# Patient Record
Sex: FEMALE | Race: WHITE | NOT HISPANIC OR LATINO | Employment: UNEMPLOYED | URBAN - METROPOLITAN AREA
[De-identification: names, ages, dates, MRNs, and addresses within clinical notes are randomized per-mention and may not be internally consistent; named-entity substitution may affect disease eponyms.]

---

## 2017-02-15 ENCOUNTER — TRANSCRIBE ORDERS (OUTPATIENT)
Dept: ADMINISTRATIVE | Facility: HOSPITAL | Age: 57
End: 2017-02-15

## 2017-02-15 DIAGNOSIS — Z12.31 VISIT FOR SCREENING MAMMOGRAM: Primary | ICD-10-CM

## 2017-03-03 ENCOUNTER — HOSPITAL ENCOUNTER (OUTPATIENT)
Dept: MAMMOGRAPHY | Facility: HOSPITAL | Age: 57
Discharge: HOME | End: 2017-03-03
Attending: OBSTETRICS & GYNECOLOGY | Admitting: OBSTETRICS & GYNECOLOGY

## 2017-03-03 DIAGNOSIS — Z12.31 VISIT FOR SCREENING MAMMOGRAM: ICD-10-CM

## 2017-03-03 PROCEDURE — 77063 BREAST TOMOSYNTHESIS BI: CPT

## 2017-03-03 PROCEDURE — 77067 SCR MAMMO BI INCL CAD: CPT | Performed by: RADIOLOGY

## 2017-03-03 PROCEDURE — 77063 BREAST TOMOSYNTHESIS BI: CPT | Performed by: RADIOLOGY

## 2017-03-03 PROCEDURE — G0202 SCR MAMMO BI INCL CAD: HCPCS

## 2018-03-19 ENCOUNTER — TRANSCRIBE ORDERS (OUTPATIENT)
Dept: ADMINISTRATIVE | Facility: HOSPITAL | Age: 58
End: 2018-03-19

## 2018-03-19 DIAGNOSIS — Z12.31 VISIT FOR SCREENING MAMMOGRAM: Primary | ICD-10-CM

## 2018-03-20 ENCOUNTER — HOSPITAL ENCOUNTER (OUTPATIENT)
Dept: MAMMOGRAPHY | Facility: HOSPITAL | Age: 58
Discharge: HOME | End: 2018-03-20
Attending: OBSTETRICS & GYNECOLOGY | Admitting: OBSTETRICS & GYNECOLOGY

## 2018-03-20 DIAGNOSIS — Z12.31 VISIT FOR SCREENING MAMMOGRAM: ICD-10-CM

## 2018-03-20 PROCEDURE — 77063 BREAST TOMOSYNTHESIS BI: CPT | Performed by: RADIOLOGY

## 2018-03-20 PROCEDURE — 77067 SCR MAMMO BI INCL CAD: CPT

## 2018-03-20 PROCEDURE — 77067 SCR MAMMO BI INCL CAD: CPT | Performed by: RADIOLOGY

## 2018-03-20 PROCEDURE — 77063 BREAST TOMOSYNTHESIS BI: CPT

## 2018-11-29 ENCOUNTER — OFFICE VISIT (OUTPATIENT)
Dept: INTERNAL MEDICINE | Facility: CLINIC | Age: 58
End: 2018-11-29

## 2018-11-29 VITALS
SYSTOLIC BLOOD PRESSURE: 122 MMHG | HEIGHT: 70 IN | DIASTOLIC BLOOD PRESSURE: 64 MMHG | RESPIRATION RATE: 16 BRPM | WEIGHT: 162 LBS | BODY MASS INDEX: 23.19 KG/M2 | HEART RATE: 72 BPM

## 2018-11-29 DIAGNOSIS — E78.2 MIXED HYPERLIPIDEMIA: Primary | ICD-10-CM

## 2018-11-29 DIAGNOSIS — Z00.00 HEALTH MAINTENANCE EXAMINATION: ICD-10-CM

## 2018-11-29 PROCEDURE — 93000 ELECTROCARDIOGRAM COMPLETE: CPT | Performed by: INTERNAL MEDICINE

## 2018-11-29 PROCEDURE — 99386 PREV VISIT NEW AGE 40-64: CPT | Performed by: INTERNAL MEDICINE

## 2018-11-29 NOTE — PROGRESS NOTES
Procedure     ECG 12 Lead  Date/Time: 11/29/2018 3:57 PM  Performed by: Jemal Rodriguez MD  Authorized by: Jemal Rodriguez MD   Comparison: not compared with previous ECG   Rhythm: sinus rhythm  Rate: normal  QRS axis: normal  Clinical impression: normal ECG  Comments: Normal ECG but for short pr.

## 2018-11-29 NOTE — PROGRESS NOTES
Subjective   Candida Werner is a 57 y.o. female.     History of Present Illness   New patient for us for physical.  She feels well.  Has a history of hyperlipidemia but has never taken medication.  Has some varicose veins and had surgery for them 10 years ago.    The following portions of the patient's history were reviewed and updated as appropriate: allergies, current medications, past family history, past medical history, past social history, past surgical history and problem list.   See form.    Review of Systems   Constitutional: Negative.  Negative for appetite change, fatigue and fever.   HENT: Negative.  Negative for congestion, ear pain, hearing loss, rhinorrhea, sinus pressure, sore throat, tinnitus and trouble swallowing.    Eyes: Negative.  Negative for redness and visual disturbance.   Respiratory: Negative.  Negative for cough, chest tightness, shortness of breath and wheezing.    Cardiovascular: Negative.  Negative for chest pain, palpitations and leg swelling.   Gastrointestinal: Negative.  Negative for abdominal distention, abdominal pain, blood in stool, constipation, diarrhea, nausea and vomiting.   Endocrine: Negative.  Negative for polydipsia, polyphagia and polyuria.   Genitourinary: Negative.  Negative for difficulty urinating, dysuria, flank pain, frequency, menstrual problem, pelvic pain, vaginal bleeding and vaginal discharge.   Musculoskeletal: Negative.  Negative for arthralgias, back pain, gait problem, joint swelling and neck pain.        Some kneee pain at time.   Skin: Negative for rash.   Allergic/Immunologic: Negative.  Negative for environmental allergies.   Neurological: Negative.  Negative for dizziness, tremors, seizures, weakness, numbness and confusion.   Hematological: Negative.  Negative for adenopathy.   Psychiatric/Behavioral: Negative.  Negative for agitation, dysphoric mood, sleep disturbance and depressed mood. The patient is not nervous/anxious.        Objective    Physical Exam   Constitutional: She is oriented to person, place, and time. She appears well-developed and well-nourished.   HENT:   Head: Normocephalic and atraumatic.   Right Ear: External ear normal.   Left Ear: External ear normal.   Nose: Nose normal.   Mouth/Throat: Oropharynx is clear and moist.   Eyes: Conjunctivae and EOM are normal. Pupils are equal, round, and reactive to light.   Neck: Normal range of motion. Neck supple. No thyromegaly present.   Cardiovascular: Normal rate, regular rhythm and intact distal pulses. Exam reveals no gallop and no friction rub.   Murmur (1/6 small systolic murmur, she has had in past.) heard.  Carotids normal.   Pulmonary/Chest: Effort normal and breath sounds normal. No respiratory distress. She has no wheezes. She has no rales. She exhibits no tenderness. Right breast exhibits no mass, no skin change and no tenderness. Left breast exhibits no mass, no skin change and no tenderness.   Abdominal: Soft. Bowel sounds are normal. She exhibits no distension and no mass. There is no tenderness.   Musculoskeletal: Normal range of motion. She exhibits no edema.   Lymphadenopathy:     She has no cervical adenopathy.   Neurological: She is alert and oriented to person, place, and time. She has normal reflexes. No cranial nerve deficit.   Skin: Skin is warm and dry.   Mild varicosities on legs.   Psychiatric: She has a normal mood and affect. Her behavior is normal. Judgment and thought content normal.   Nursing note and vitals reviewed.        Assessment/Plan   Candida was seen today for establish care.    Diagnoses and all orders for this visit:    Mixed hyperlipidemia  -     Comprehensive Metabolic Panel  -     Lipid Panel  -     High Sensitivity CRP; Future    Health maintenance examination  -     CBC (No Diff)    She seems healthy but for lipids.  Labs as noted.  Recommended consider Cardiac calcium scan.  ECG basically is normal.  Counseled on diet and immunizations.  Health  maintenance is up to date.  Recheck yearly.

## 2018-12-19 ENCOUNTER — LAB (OUTPATIENT)
Dept: INTERNAL MEDICINE | Facility: CLINIC | Age: 58
End: 2018-12-19

## 2018-12-19 DIAGNOSIS — E78.2 MIXED HYPERLIPIDEMIA: ICD-10-CM

## 2018-12-19 LAB
ALBUMIN SERPL-MCNC: 4.27 G/DL (ref 3.2–4.8)
ALBUMIN/GLOB SERPL: 2.1 G/DL (ref 1.5–2.5)
ALP SERPL-CCNC: 57 U/L (ref 25–100)
ALT SERPL W P-5'-P-CCNC: 22 U/L (ref 7–40)
ANION GAP SERPL CALCULATED.3IONS-SCNC: 2 MMOL/L (ref 3–11)
ARTICHOKE IGE QN: 185 MG/DL (ref 0–130)
AST SERPL-CCNC: 22 U/L (ref 0–33)
BILIRUB SERPL-MCNC: 0.6 MG/DL (ref 0.3–1.2)
BUN BLD-MCNC: 14 MG/DL (ref 9–23)
BUN/CREAT SERPL: 16.9 (ref 7–25)
CALCIUM SPEC-SCNC: 9 MG/DL (ref 8.7–10.4)
CHLORIDE SERPL-SCNC: 103 MMOL/L (ref 99–109)
CHOLEST SERPL-MCNC: 253 MG/DL (ref 0–200)
CO2 SERPL-SCNC: 33 MMOL/L (ref 20–31)
CREAT BLD-MCNC: 0.83 MG/DL (ref 0.6–1.3)
CRP SERPL-MCNC: 0.02 MG/DL (ref 0–0.5)
GFR SERPL CREATININE-BSD FRML MDRD: 70 ML/MIN/1.73
GLOBULIN UR ELPH-MCNC: 2 GM/DL
GLUCOSE BLD-MCNC: 91 MG/DL (ref 70–100)
HDLC SERPL-MCNC: 66 MG/DL (ref 40–60)
POTASSIUM BLD-SCNC: 4.1 MMOL/L (ref 3.5–5.5)
PROT SERPL-MCNC: 6.3 G/DL (ref 5.7–8.2)
SODIUM BLD-SCNC: 138 MMOL/L (ref 132–146)
TRIGL SERPL-MCNC: 59 MG/DL (ref 0–150)

## 2018-12-19 PROCEDURE — 86141 C-REACTIVE PROTEIN HS: CPT | Performed by: INTERNAL MEDICINE

## 2018-12-19 PROCEDURE — 80053 COMPREHEN METABOLIC PANEL: CPT | Performed by: INTERNAL MEDICINE

## 2018-12-19 PROCEDURE — 36415 COLL VENOUS BLD VENIPUNCTURE: CPT | Performed by: INTERNAL MEDICINE

## 2018-12-19 PROCEDURE — 80061 LIPID PANEL: CPT | Performed by: INTERNAL MEDICINE

## 2018-12-20 ENCOUNTER — TELEPHONE (OUTPATIENT)
Dept: INTERNAL MEDICINE | Facility: CLINIC | Age: 58
End: 2018-12-20

## 2018-12-20 DIAGNOSIS — Z00.00 HEALTH MAINTENANCE EXAMINATION: Primary | ICD-10-CM

## 2018-12-20 NOTE — TELEPHONE ENCOUNTER
12-20-18  Patients CBC needs to be recollected due to plt. Clumping needs to be drawn in sodium citrate tube when redrawing her .Do you want patient called back in for this?

## 2018-12-21 ENCOUNTER — TELEPHONE (OUTPATIENT)
Dept: INTERNAL MEDICINE | Facility: CLINIC | Age: 58
End: 2018-12-21

## 2018-12-21 ENCOUNTER — LAB (OUTPATIENT)
Dept: INTERNAL MEDICINE | Facility: CLINIC | Age: 58
End: 2018-12-21

## 2018-12-21 DIAGNOSIS — E78.2 MIXED HYPERLIPIDEMIA: ICD-10-CM

## 2018-12-21 DIAGNOSIS — D69.6 THROMBOCYTOPENIA (HCC): Primary | ICD-10-CM

## 2018-12-21 DIAGNOSIS — Z00.00 HEALTH MAINTENANCE EXAMINATION: ICD-10-CM

## 2018-12-21 LAB
DEPRECATED RDW RBC AUTO: 40.6 FL (ref 37–54)
ERYTHROCYTE [DISTWIDTH] IN BLOOD BY AUTOMATED COUNT: 12.2 % (ref 11.3–14.5)
HCT VFR BLD AUTO: 39.4 % (ref 34.5–44)
HGB BLD-MCNC: 13 G/DL (ref 11.5–15.5)
MCH RBC QN AUTO: 29.9 PG (ref 27–31)
MCHC RBC AUTO-ENTMCNC: 33 G/DL (ref 32–36)
MCV RBC AUTO: 90.6 FL (ref 80–99)
PLATELET # BLD AUTO: 16 10*3/MM3 (ref 150–450)
PLATELETS (CITRATED) BY AUTOMATED COUNT: 39 10*3/MM3 (ref 150–450)
PMV BLD AUTO: 10.5 FL (ref 6–12)
RBC # BLD AUTO: 4.35 10*6/MM3 (ref 3.89–5.14)
WBC NRBC COR # BLD: 3.58 10*3/MM3 (ref 3.5–10.8)

## 2018-12-21 PROCEDURE — 85027 COMPLETE CBC AUTOMATED: CPT | Performed by: INTERNAL MEDICINE

## 2018-12-21 PROCEDURE — 36415 COLL VENOUS BLD VENIPUNCTURE: CPT | Performed by: INTERNAL MEDICINE

## 2018-12-21 PROCEDURE — 85049 AUTOMATED PLATELET COUNT: CPT | Performed by: INTERNAL MEDICINE

## 2018-12-24 ENCOUNTER — TELEPHONE (OUTPATIENT)
Dept: INTERNAL MEDICINE | Facility: CLINIC | Age: 58
End: 2018-12-24

## 2019-01-02 PROBLEM — D69.6 THROMBOCYTOPENIA (HCC): Status: ACTIVE | Noted: 2019-01-02

## 2019-01-03 ENCOUNTER — CONSULT (OUTPATIENT)
Dept: ONCOLOGY | Facility: CLINIC | Age: 59
End: 2019-01-03

## 2019-01-03 ENCOUNTER — LAB (OUTPATIENT)
Dept: LAB | Facility: HOSPITAL | Age: 59
End: 2019-01-03

## 2019-01-03 VITALS
HEIGHT: 70 IN | RESPIRATION RATE: 16 BRPM | TEMPERATURE: 98.8 F | BODY MASS INDEX: 23.34 KG/M2 | OXYGEN SATURATION: 99 % | SYSTOLIC BLOOD PRESSURE: 120 MMHG | HEART RATE: 64 BPM | DIASTOLIC BLOOD PRESSURE: 80 MMHG | WEIGHT: 163 LBS

## 2019-01-03 DIAGNOSIS — D69.6 THROMBOCYTOPENIA (HCC): Primary | Chronic | ICD-10-CM

## 2019-01-03 DIAGNOSIS — D69.6 THROMBOCYTOPENIA (HCC): ICD-10-CM

## 2019-01-03 LAB
ALBUMIN SERPL-MCNC: 4.31 G/DL (ref 3.2–4.8)
ALBUMIN/GLOB SERPL: 2.1 G/DL (ref 1.5–2.5)
ALP SERPL-CCNC: 67 U/L (ref 25–100)
ALT SERPL W P-5'-P-CCNC: 23 U/L (ref 7–40)
ANION GAP SERPL CALCULATED.3IONS-SCNC: 6 MMOL/L (ref 3–11)
APTT PPP: 26.1 SECONDS (ref 24–31)
AST SERPL-CCNC: 23 U/L (ref 0–33)
BASOPHILS # BLD AUTO: 0.02 10*3/MM3 (ref 0–0.2)
BASOPHILS NFR BLD AUTO: 0.4 % (ref 0–1)
BILIRUB SERPL-MCNC: 0.5 MG/DL (ref 0.3–1.2)
BUN BLD-MCNC: 19 MG/DL (ref 9–23)
BUN/CREAT SERPL: 23.2 (ref 7–25)
CALCIUM SPEC-SCNC: 9.3 MG/DL (ref 8.7–10.4)
CHLORIDE SERPL-SCNC: 102 MMOL/L (ref 99–109)
CO2 SERPL-SCNC: 29 MMOL/L (ref 20–31)
CREAT BLD-MCNC: 0.82 MG/DL (ref 0.6–1.3)
D DIMER PPP FEU-MCNC: <0.19 MG/L (FEU) (ref 0–0.5)
DEPRECATED RDW RBC AUTO: 41.3 FL (ref 37–54)
EOSINOPHIL # BLD AUTO: 0.1 10*3/MM3 (ref 0–0.3)
EOSINOPHIL NFR BLD AUTO: 1.9 % (ref 0–3)
ERYTHROCYTE [DISTWIDTH] IN BLOOD BY AUTOMATED COUNT: 12.4 % (ref 11.3–14.5)
ERYTHROCYTE [SEDIMENTATION RATE] IN BLOOD: 8 MM/HR (ref 0–30)
FIBRINOGEN PPP-MCNC: 224 MG/DL (ref 198–466)
FSP PPP LA-ACNC: NORMAL
GFR SERPL CREATININE-BSD FRML MDRD: 71 ML/MIN/1.73
GLOBULIN UR ELPH-MCNC: 2.1 GM/DL
GLUCOSE BLD-MCNC: 100 MG/DL (ref 70–100)
HCT VFR BLD AUTO: 40.9 % (ref 34.5–44)
HGB BLD-MCNC: 13.3 G/DL (ref 11.5–15.5)
IMM GRANULOCYTES # BLD AUTO: 0.01 10*3/MM3 (ref 0–0.03)
IMM GRANULOCYTES NFR BLD AUTO: 0.2 % (ref 0–0.6)
INR PPP: 1 (ref 0.91–1.09)
LYMPHOCYTES # BLD AUTO: 1.21 10*3/MM3 (ref 0.6–4.8)
LYMPHOCYTES NFR BLD AUTO: 23 % (ref 24–44)
MCH RBC QN AUTO: 29.5 PG (ref 27–31)
MCHC RBC AUTO-ENTMCNC: 32.5 G/DL (ref 32–36)
MCV RBC AUTO: 90.7 FL (ref 80–99)
MONOCYTES # BLD AUTO: 0.38 10*3/MM3 (ref 0–1)
MONOCYTES NFR BLD AUTO: 7.2 % (ref 0–12)
NEUTROPHILS # BLD AUTO: 3.55 10*3/MM3 (ref 1.5–8.3)
NEUTROPHILS NFR BLD AUTO: 67.5 % (ref 41–71)
PLATELET # BLD AUTO: 100 10*3/MM3 (ref 150–450)
PMV BLD AUTO: 11.5 FL (ref 6–12)
POTASSIUM BLD-SCNC: 4.3 MMOL/L (ref 3.5–5.5)
PROT SERPL-MCNC: 6.4 G/DL (ref 5.7–8.2)
PROTHROMBIN TIME: 10.5 SECONDS (ref 9.6–11.5)
RBC # BLD AUTO: 4.51 10*6/MM3 (ref 3.89–5.14)
SODIUM BLD-SCNC: 137 MMOL/L (ref 132–146)
WBC NRBC COR # BLD: 5.26 10*3/MM3 (ref 3.5–10.8)

## 2019-01-03 PROCEDURE — 85384 FIBRINOGEN ACTIVITY: CPT

## 2019-01-03 PROCEDURE — 85060 BLOOD SMEAR INTERPRETATION: CPT

## 2019-01-03 PROCEDURE — 85025 COMPLETE CBC W/AUTO DIFF WBC: CPT

## 2019-01-03 PROCEDURE — 85379 FIBRIN DEGRADATION QUANT: CPT

## 2019-01-03 PROCEDURE — 85610 PROTHROMBIN TIME: CPT

## 2019-01-03 PROCEDURE — 86677 HELICOBACTER PYLORI ANTIBODY: CPT

## 2019-01-03 PROCEDURE — 99245 OFF/OP CONSLTJ NEW/EST HI 55: CPT | Performed by: INTERNAL MEDICINE

## 2019-01-03 PROCEDURE — 85652 RBC SED RATE AUTOMATED: CPT

## 2019-01-03 PROCEDURE — 36415 COLL VENOUS BLD VENIPUNCTURE: CPT

## 2019-01-03 PROCEDURE — 80053 COMPREHEN METABOLIC PANEL: CPT

## 2019-01-03 PROCEDURE — 85362 FIBRIN DEGRADATION PRODUCTS: CPT

## 2019-01-03 PROCEDURE — 86038 ANTINUCLEAR ANTIBODIES: CPT

## 2019-01-03 PROCEDURE — 86430 RHEUMATOID FACTOR TEST QUAL: CPT

## 2019-01-03 PROCEDURE — 85730 THROMBOPLASTIN TIME PARTIAL: CPT

## 2019-01-03 RX ORDER — CONJUGATED ESTROGENS 0.62 MG/G
CREAM VAGINAL
COMMUNITY
Start: 2018-12-10 | End: 2020-11-12

## 2019-01-04 LAB
ANA SER QL IA: NEGATIVE
CYTOLOGIST CVX/VAG CYTO: NORMAL
H PYLORI IGA SER IA-ACNC: <9 UNITS (ref 0–8.9)
H PYLORI IGG SER IA-ACNC: 0.23 INDEX VALUE (ref 0–0.79)
H PYLORI IGM SER-ACNC: <9 UNITS (ref 0–8.9)
PATH INTERP BLD-IMP: NORMAL
RHEUMATOID FACT SERPL-ACNC: NEGATIVE [IU]/ML

## 2019-01-11 ENCOUNTER — OFFICE VISIT (OUTPATIENT)
Dept: ONCOLOGY | Facility: CLINIC | Age: 59
End: 2019-01-11

## 2019-01-11 ENCOUNTER — LAB (OUTPATIENT)
Dept: LAB | Facility: HOSPITAL | Age: 59
End: 2019-01-11

## 2019-01-11 VITALS
WEIGHT: 164 LBS | HEIGHT: 70 IN | BODY MASS INDEX: 23.48 KG/M2 | RESPIRATION RATE: 14 BRPM | TEMPERATURE: 98.7 F | OXYGEN SATURATION: 98 % | HEART RATE: 70 BPM | SYSTOLIC BLOOD PRESSURE: 139 MMHG | DIASTOLIC BLOOD PRESSURE: 72 MMHG

## 2019-01-11 DIAGNOSIS — D69.6 THROMBOCYTOPENIA, UNSPECIFIED (HCC): Primary | ICD-10-CM

## 2019-01-11 DIAGNOSIS — D69.6 THROMBOCYTOPENIA (HCC): ICD-10-CM

## 2019-01-11 DIAGNOSIS — D69.6 THROMBOCYTOPENIA (HCC): Primary | Chronic | ICD-10-CM

## 2019-01-11 LAB
ERYTHROCYTE [DISTWIDTH] IN BLOOD BY AUTOMATED COUNT: 12.6 % (ref 11.3–14.5)
HCT VFR BLD AUTO: 39.2 % (ref 34.5–44)
HGB BLD-MCNC: 13.1 G/DL (ref 11.5–15.5)
LYMPHOCYTES # BLD AUTO: 1.6 10*3/MM3 (ref 0.6–4.8)
LYMPHOCYTES NFR BLD AUTO: 31.9 % (ref 24–44)
MCH RBC QN AUTO: 30.3 PG (ref 27–31)
MCHC RBC AUTO-ENTMCNC: 33.5 G/DL (ref 32–36)
MCV RBC AUTO: 90.5 FL (ref 80–99)
MONOCYTES # BLD AUTO: 0.2 10*3/MM3 (ref 0–1)
MONOCYTES NFR BLD AUTO: 3.4 % (ref 0–12)
NEUTROPHILS # BLD AUTO: 3.2 10*3/MM3 (ref 1.5–8.3)
NEUTROPHILS NFR BLD AUTO: 64.7 % (ref 41–71)
PLATELET # BLD AUTO: 129 10*3/MM3 (ref 150–450)
PLATELETS (CITRATED) BY AUTOMATED COUNT: 187 10*3/MM3 (ref 150–450)
PMV BLD AUTO: 9 FL (ref 6–12)
RBC # BLD AUTO: 4.33 10*6/MM3 (ref 3.89–5.14)
WBC NRBC COR # BLD: 5 10*3/MM3 (ref 3.5–10.8)

## 2019-01-11 PROCEDURE — 85025 COMPLETE CBC W/AUTO DIFF WBC: CPT

## 2019-01-11 PROCEDURE — 36415 COLL VENOUS BLD VENIPUNCTURE: CPT

## 2019-01-11 PROCEDURE — 99213 OFFICE O/P EST LOW 20 MIN: CPT | Performed by: INTERNAL MEDICINE

## 2019-01-11 PROCEDURE — 85049 AUTOMATED PLATELET COUNT: CPT

## 2019-01-11 NOTE — PROGRESS NOTES
CHIEF COMPLAINT: Probable pseudothrombocytopenia    Problem List:  Oncology/Hematology History    1. Thrombocytopenia: Had not seen a physician for years.  Decided to get established and saw Dr. Rodriguez.  At that junction had routine labs.On 12/21/18 CBC revealed white count of 3580 with hemoglobin 13 and platelets of 16,000 with platelets in a citrated tube of 39,000.  C-reactive protein was normal.  Total cholesterol was quite high at 253 with an LDL of 185.  CMP was unremarkable including creatinine of 0.83.  No petechiae or ecchymoses.  No easy bruising or bleeding with trauma.  No fevers chills or unexplained weight loss.  No rashes.  No consumption of undercooked beef.  No symptoms of reflux to suggest Helicobacter  pylori exposure.  Has not been taking H2 blockers which are known to cause this as well.  Given the lack of clinical symptoms, even with a platelet count of 16,000, I suggested that we hold on steroids and look at her peripheral smear for platelet clumping and/or schistocytes.  I would check her d-dimer and fibrin split products along with fibrinogen though I doubt DIC or TTP/HUS.  No hint of hemolysis or renal dysfunction.  No clinical symptoms to suggest other autoimmune diseases other than she has lichen planus which may be an additional autoimmune process.  I think the odds of this being an M3 promyelocytic leukemia is extremely unlikely but I will look at her peripheral smear along with JEANE and rheumatoid factor were hence of other autoimmune phenomena.  While this could be pseudothrombocytopenia given the slightly higher platelet count in a citrated tube, the degree of the thrombocytopenia would be a bit unusual for that but we shall see.  Given her complete lack of any stigmata for thrombocytopenia I will not start her on steroids yet even with the platelet count initially of 16,000 as I'm not convinced yet of the actual platelet count.  Lastly, I told her to hold her niacin for the time  being which can be associated with thrombocytopenia as well as a prolonged pro time.  However, she says she does not take a big dose of this and does not necessarily take it daily.  -1/10/19: Return for review of the above labs.  Peripheral smear showed platelet clumping with a platelet count 100,000 and the estimated platelet quantity to be normal.  Helicobacter pylori was negative.  Normal coagulation studies.  Fibrin split products less than 10.  D-dimer negative.  Rheumatoid factor and JEANE negative.  Sedimentation rate 8.  CMP normal.  I will repeat her CBC and make citrate and heparin tubes.  Strongly suspect pseudothrombocytopenia.  No schistocytes on the peripheral smear        Thrombocytopenia (CMS/HCC)    1/2/2019 Initial Diagnosis     Thrombocytopenia (CMS/HCC)            HISTORY OF PRESENT ILLNESS:  The patient is a 58 y.o. female, here for follow up on management of probable pseudothrombocytopenia.  See above for my synopsis of her laboratories to date.  She did have platelet clumping on her peripheral smear but the platelet number appeared adequate to the pathologist's eye and the machine count was 100,000      Past Medical History:   Diagnosis Date   • Fibrocystic breast    • Hyperlipidemia    • Ovarian tumor      Past Surgical History:   Procedure Laterality Date   • HAND SURGERY         No Known Allergies    Family History and Social History reviewed and changed as necessary      REVIEW OF SYSTEM:   Review of Systems   Constitutional: Negative for appetite change, chills, diaphoresis, fatigue, fever and unexpected weight change.   HENT:   Negative for mouth sores, sore throat and trouble swallowing.    Eyes: Negative for icterus.   Respiratory: Negative for cough, hemoptysis and shortness of breath.    Cardiovascular: Negative for chest pain, leg swelling and palpitations.   Gastrointestinal: Negative for abdominal distention, abdominal pain, blood in stool, constipation, diarrhea, nausea and  "vomiting.   Endocrine: Negative for hot flashes.   Genitourinary: Negative for bladder incontinence, difficulty urinating, dysuria, frequency and hematuria.    Musculoskeletal: Negative for gait problem, neck pain and neck stiffness.   Skin: Negative for rash.   Neurological: Negative for dizziness, gait problem, headaches, light-headedness and numbness.   Hematological: Negative for adenopathy. Does not bruise/bleed easily.   Psychiatric/Behavioral: Negative for depression. The patient is not nervous/anxious.    All other systems reviewed and are negative.       PHYSICAL EXAM    Vitals:    01/11/19 0953   BP: 139/72   Pulse: 70   Resp: 14   Temp: 98.7 °F (37.1 °C)   SpO2: 98%   Weight: 74.4 kg (164 lb)   Height: 177.8 cm (70\")     Constitutional: Appears well-developed and well-nourished. No distress.   ECOG: (0) Fully active, able to carry on all predisease performance without restriction  HENT:   Head: Normocephalic.   Mouth/Throat: Oropharynx is clear and moist.   Eyes: Conjunctivae are normal. Pupils are equal, round, and reactive to light. No scleral icterus.   Neck: Neck supple. No JVD present. No thyromegaly present.   Cardiovascular: Normal rate, regular rhythm and normal heart sounds.    Pulmonary/Chest: Breath sounds normal. No respiratory distress.   Abdominal: Soft. Exhibits no distension and no mass. There is no hepatosplenomegaly. There is no tenderness. There is no rebound and no guarding.   Musculoskeletal:Exhibits no edema, tenderness or deformity.   Neurological: Alert and oriented to person, place, and time. Exhibits normal muscle tone.   Skin: No ecchymosis, no petechiae and no rash noted. Not diaphoretic. No cyanosis. Nails show no clubbing.   Psychiatric: Normal mood and affect.   Vitals reviewed.      No radiology results for the last 7 days          ASSESSMENT & PLAN:    1.  Probable pseudothrombocytopenia: I will check her CBC today and in EDTA, citrate, and heparin tubes and we will call " her these results.  I do not think she needs further workup beyond that and there is corroborating evidence of platelet clumping on her peripheral smear with normal numbers of platelets visually under the microscope.  No evidence of DIC or other autoimmune processes to suggest immune destruction in the setting of other autoimmune disease.  This could still be ITP as there is no way to rule that out completely but I think we have a good explanation with the platelet clumping.  There are no schistocytes to suggest microangiopathic process either.  Her JEANE and rheumatoid factor and sedimentation rate were normal.  Coagulations normal.  Helicobacter pylori negative.  D-dimer and fibrin split products normal.  Discussed with patient 20 minutes greater than 50% spent counseling      Abhi Price MD    01/11/2019    Addendum: Her platelet count in EDTA today was 120,000's.  In citrate tube her platelet count was over 180,000.  She has pseudothrombocytopenia and when she needs a blood count she should get this done with citrate to check the platelet count.  I will see as needed

## 2019-01-14 ENCOUNTER — HOSPITAL ENCOUNTER (OUTPATIENT)
Dept: CT IMAGING | Facility: HOSPITAL | Age: 59
End: 2019-01-14
Attending: INTERNAL MEDICINE

## 2019-01-31 ENCOUNTER — HOSPITAL ENCOUNTER (OUTPATIENT)
Dept: CT IMAGING | Facility: HOSPITAL | Age: 59
Discharge: HOME | End: 2019-01-31
Attending: INTERNAL MEDICINE | Admitting: INTERNAL MEDICINE

## 2019-01-31 DIAGNOSIS — E78.2 MIXED HYPERLIPIDEMIA: ICD-10-CM

## 2019-01-31 PROCEDURE — 75571 CT HRT W/O DYE W/CA TEST: CPT

## 2019-02-05 ENCOUNTER — TELEPHONE (OUTPATIENT)
Dept: INTERNAL MEDICINE | Facility: CLINIC | Age: 59
End: 2019-02-05

## 2019-02-05 NOTE — TELEPHONE ENCOUNTER
----- Message from Jemal Rodriguez MD sent at 2/3/2019 11:00 AM EST -----  Please send out the letter.  Thanks.

## 2019-03-08 ENCOUNTER — TELEPHONE (OUTPATIENT)
Dept: INTERNAL MEDICINE | Facility: CLINIC | Age: 59
End: 2019-03-08

## 2019-03-08 RX ORDER — OSELTAMIVIR PHOSPHATE 75 MG/1
75 CAPSULE ORAL DAILY
Qty: 7 CAPSULE | Refills: 0 | Status: SHIPPED | OUTPATIENT
Start: 2019-03-08 | End: 2019-03-15

## 2019-03-08 NOTE — TELEPHONE ENCOUNTER
----- Message from Elizabeth Nagel sent at 3/8/2019  2:03 PM EST -----  Contact: SELF  BHASKAR CAR WOKE UP WITH A SCRATCHY THROAT THIS MORNING AND HER GRANDCHILDREN WHO SHE SAW ON Monday AND Tuesday WERE JUST DIAGNOSIS WITH THE FLU. SHE WANTS TO KNOW IF SHE CAN GET SOMETHING CALLED IN FOR THIS. SHE USES THE [facility] IN [place]. SHE CAN BE REACHED [phone]

## 2019-03-08 NOTE — TELEPHONE ENCOUNTER
Tamiflu ppx sent in. Can also take OTC Mucinex and/or Sudafed PRN. Would use Sudafed sparingly due to risk for HTN.    Needs to be seen if F/C, SOB, not getting better w/ above.

## 2019-03-27 ENCOUNTER — OFFICE VISIT (OUTPATIENT)
Dept: INTERNAL MEDICINE | Facility: CLINIC | Age: 59
End: 2019-03-27

## 2019-03-27 VITALS
TEMPERATURE: 99.3 F | DIASTOLIC BLOOD PRESSURE: 80 MMHG | RESPIRATION RATE: 18 BRPM | HEART RATE: 64 BPM | SYSTOLIC BLOOD PRESSURE: 120 MMHG | BODY MASS INDEX: 22.76 KG/M2 | OXYGEN SATURATION: 97 % | WEIGHT: 158.6 LBS

## 2019-03-27 DIAGNOSIS — J32.9 SINUSITIS, UNSPECIFIED CHRONICITY, UNSPECIFIED LOCATION: Primary | ICD-10-CM

## 2019-03-27 PROCEDURE — 99213 OFFICE O/P EST LOW 20 MIN: CPT | Performed by: NURSE PRACTITIONER

## 2019-03-27 RX ORDER — CEFDINIR 300 MG/1
300 CAPSULE ORAL 2 TIMES DAILY
Qty: 20 CAPSULE | Refills: 0 | OUTPATIENT
Start: 2019-03-27 | End: 2019-07-31

## 2019-03-27 NOTE — PROGRESS NOTES
Chief Complaint   Patient presents with   • SAME DAY     flu on march 9, fever, still congestion. sore throat, patient feels she has a cold         Subjective     History of Present Illness   Patient is here today for cough slight runny eyes.  Fever on 3/9/19 --recent babysitting grandchild with flu.  Took motrin 2 and slept well and took tamiflu.     She felt like she was getting over the cold. [place] made ears clog.  Started mucinex for one week. Also had conjunctivitis.     Dry cough ST.  Using netti with dark yellow dc.         No problem-specific Assessment & Plan notes found for this encounter.      The following portions of the patient's history were reviewed and updated as appropriate: allergies, current medications, past family history, past medical history, past social history, past surgical history and problem list.    Review of Systems   Constitutional: Positive for chills and fever. Negative for activity change, appetite change and fatigue.   HENT: Positive for congestion and postnasal drip. Negative for sore throat.    Eyes: Positive for discharge. Negative for visual disturbance.   Respiratory: Positive for cough. Negative for shortness of breath.    Cardiovascular: Negative for chest pain, palpitations and leg swelling.   Gastrointestinal: Negative for abdominal pain, constipation, diarrhea, nausea and GERD.   Musculoskeletal: Positive for arthralgias. Negative for myalgias.   Skin: Negative for rash.   Allergic/Immunologic: Negative for environmental allergies.   Neurological: Negative for dizziness.   Psychiatric/Behavioral: Negative for sleep disturbance.   All other systems reviewed and are negative.      Objective   Physical Exam   Constitutional: She is oriented to person, place, and time. She appears well-developed and well-nourished.   HENT:   Head: Normocephalic and atraumatic.   Right Ear: External ear normal.   Left Ear: External ear normal.   Mucosa boggy.  Nasal drainage  clear moderate.  Bilateral TMs dull trace fluid   Neck: No thyromegaly present.   Cardiovascular: Normal rate, regular rhythm and intact distal pulses.   Pulmonary/Chest: Effort normal and breath sounds normal.   Abdominal: Soft. Bowel sounds are normal. She exhibits no distension. There is no tenderness.   Musculoskeletal: She exhibits no edema.   Lymphadenopathy:     She has no cervical adenopathy.   Neurological: She is alert and oriented to person, place, and time.   Skin: Capillary refill takes 2 to 3 seconds.   Psychiatric: She has a normal mood and affect. Her behavior is normal.   Nursing note and vitals reviewed.            Assessment/Plan   Candida was seen today for same day.    Diagnoses and all orders for this visit:    Sinusitis, unspecified chronicity, unspecified location  -     cefdinir (OMNICEF) 300 MG capsule; Take 1 capsule by mouth 2 (Two) Times a Day.      Over-the-counter Mucinex with plenty of fluids no changes she will let us know  Return if symptoms worsen or fail to improve.  RTC/call  If symptoms worsen  Meds MOA and SE's reviewed and pt v/u

## 2019-07-30 ENCOUNTER — TELEPHONE (OUTPATIENT)
Dept: INTERNAL MEDICINE | Facility: CLINIC | Age: 59
End: 2019-07-30

## 2019-07-30 DIAGNOSIS — Z23 NEED FOR TETANUS BOOSTER: Primary | ICD-10-CM

## 2019-07-31 PROBLEM — Z23: Status: ACTIVE | Noted: 2019-07-31

## 2019-07-31 PROBLEM — T14.8XXA PUNCTURE WOUND: Status: ACTIVE | Noted: 2019-07-31

## 2019-08-08 ENCOUNTER — TRANSCRIBE ORDERS (OUTPATIENT)
Dept: ADMINISTRATIVE | Facility: HOSPITAL | Age: 59
End: 2019-08-08

## 2019-08-08 DIAGNOSIS — Z12.31 VISIT FOR SCREENING MAMMOGRAM: Primary | ICD-10-CM

## 2020-02-21 ENCOUNTER — TRANSCRIBE ORDERS (OUTPATIENT)
Dept: ADMINISTRATIVE | Facility: HOSPITAL | Age: 60
End: 2020-02-21

## 2020-05-07 ENCOUNTER — APPOINTMENT (OUTPATIENT)
Dept: MAMMOGRAPHY | Facility: HOSPITAL | Age: 60
End: 2020-05-07

## 2020-07-23 ENCOUNTER — TRANSCRIBE ORDERS (OUTPATIENT)
Dept: ADMINISTRATIVE | Facility: HOSPITAL | Age: 60
End: 2020-07-23

## 2020-07-23 DIAGNOSIS — Z12.31 VISIT FOR SCREENING MAMMOGRAM: Primary | ICD-10-CM

## 2020-07-24 ENCOUNTER — APPOINTMENT (OUTPATIENT)
Dept: MAMMOGRAPHY | Facility: HOSPITAL | Age: 60
End: 2020-07-24

## 2020-10-26 ENCOUNTER — OFFICE VISIT (OUTPATIENT)
Dept: OBSTETRICS AND GYNECOLOGY | Facility: CLINIC | Age: 60
End: 2020-10-26

## 2020-10-26 VITALS
HEIGHT: 69 IN | DIASTOLIC BLOOD PRESSURE: 72 MMHG | WEIGHT: 154.1 LBS | BODY MASS INDEX: 22.82 KG/M2 | SYSTOLIC BLOOD PRESSURE: 126 MMHG

## 2020-10-26 DIAGNOSIS — I86.3 VARICOSE VEINS OF VULVA AND PERINEUM: ICD-10-CM

## 2020-10-26 DIAGNOSIS — Z78.0 MENOPAUSE: ICD-10-CM

## 2020-10-26 DIAGNOSIS — N94.89 VULVAR BURNING: ICD-10-CM

## 2020-10-26 DIAGNOSIS — M85.80 OSTEOPENIA, SENILE: ICD-10-CM

## 2020-10-26 DIAGNOSIS — Z12.31 BREAST CANCER SCREENING BY MAMMOGRAM: ICD-10-CM

## 2020-10-26 DIAGNOSIS — Z01.419 WOMEN'S ANNUAL ROUTINE GYNECOLOGICAL EXAMINATION: Primary | ICD-10-CM

## 2020-10-26 DIAGNOSIS — Z80.3 FAMILY HISTORY OF BREAST CANCER IN SISTER: ICD-10-CM

## 2020-10-26 PROCEDURE — 99396 PREV VISIT EST AGE 40-64: CPT | Performed by: OBSTETRICS & GYNECOLOGY

## 2020-10-26 PROCEDURE — 99213 OFFICE O/P EST LOW 20 MIN: CPT | Performed by: OBSTETRICS & GYNECOLOGY

## 2020-10-26 NOTE — PROGRESS NOTES
GYN Annual Exam     CC - Here for annual exam.        HPI  Candida Werner is a 59 y.o. female, , who presents for annual well woman exam. Pt would like to discuss the ovarian mass she was diagnosed with 3 years ago. She is postmenopausal.  Patient denies vaginal bleeding. ..  Patient reports problems with: vulvar itching and burning.  Patient reports the vulvar itching and burning of his occurred on and off for the past couple of months.  She reports after exercise and staying in workout close it is worse.  She is not tried any medicines to help.  She has tried to think that foods that have high acid may bother it and has tried to avoid it.  Partner Status: Marital Status: .  New Partners since last visit: no.      Additional OB/GYN History   Current contraception: contraceptive methods: Post menopausal status  Desires to: do not start contraception  On HRT? No  Last Pap :   Last Completed Pap Smear       Status Date      PAP SMEAR Done 10/10/2019 in [facility] (negative)        History of abnormal Pap smear: no  Family history of uterine, colon, breast, or ovarian cancer: yes - sister had breast cancer  Performs monthly Self-Breast Exam: no  Last mammogram:   Last Completed Mammogram       Status Date      MAMMOGRAM Done 3/20/2018 MAMMO SCREENING DIGITAL TOMOSYNTHESIS BILATERAL W CAD     Patient has more history with this topic...        Last colonoscopy: Patient reports this 2020  Last Completed Colonoscopy       Status Date      COLONOSCOPY No completions recorded        Last DEXA: On 10/25/2019 and results were Osteopenia  Exercises Regularly: yes  Feelings of Anxiety or Depression: no      Tobacco Usage?: No   OB History        2    Para   2    Term   2            AB        Living           SAB        TAB        Ectopic        Molar        Multiple        Live Births                    Health Maintenance   Topic Date Due   • Annual Gynecologic Pelvic and Breast Exam   "1960   • COLONOSCOPY  1960   • ZOSTER VACCINE (1 of 2) 12/26/2008   • HEPATITIS C SCREENING  11/29/2018   • ANNUAL PHYSICAL  11/30/2019   • LIPID PANEL  12/19/2019   • MAMMOGRAM  03/20/2020   • INFLUENZA VACCINE  08/01/2020   • DXA SCAN  10/26/2020   • PAP SMEAR  10/10/2022   • TDAP/TD VACCINES (2 - Tdap) 07/31/2029   • Pneumococcal Vaccine 0-64  Aged Out       The additional following portions of the patient's history were reviewed and updated as appropriate: allergies, current medications, past family history, past medical history, past social history, past surgical history and problem list.    Review of Systems   Constitutional: Negative.    HENT: Negative.    Eyes: Negative.    Respiratory: Negative.    Cardiovascular: Negative.    Gastrointestinal: Negative.    Endocrine: Negative.    Genitourinary: Negative.    Musculoskeletal: Negative.    Allergic/Immunologic: Negative.    Neurological: Negative.    Hematological: Negative.    Psychiatric/Behavioral: Negative.      All other systems reviewed and are negative.     I have reviewed and agree with the HPI, ROS, and historical information as entered above. Mckenna López MD    Objective   /72 (BP Location: Right arm, Patient Position: Sitting, Cuff Size: Adult)   Ht 175.3 cm (69\")   Wt 69.9 kg (154 lb 1.6 oz)   BMI 22.76 kg/m²     Physical Exam  Vitals signs and nursing note reviewed. Exam conducted with a chaperone present.   Constitutional:       Appearance: She is well-developed.   HENT:      Head: Normocephalic and atraumatic.   Neck:      Musculoskeletal: Normal range of motion. No muscular tenderness.      Thyroid: No thyroid mass or thyromegaly.   Cardiovascular:      Rate and Rhythm: Normal rate and regular rhythm.      Heart sounds: No murmur.   Pulmonary:      Effort: Pulmonary effort is normal. No retractions.      Breath sounds: Normal breath sounds. No wheezing, rhonchi or rales.   Chest:      Chest wall: No mass or " tenderness.      Breasts:         Right: Normal. No mass, nipple discharge, skin change or tenderness.         Left: Normal. No mass, nipple discharge, skin change or tenderness.   Abdominal:      General: Bowel sounds are normal.      Palpations: Abdomen is soft. Abdomen is not rigid. There is no mass.      Tenderness: There is no abdominal tenderness. There is no guarding.      Hernia: No hernia is present. There is no hernia in the left inguinal area or right inguinal area.   Genitourinary:     General: Normal vulva.      Exam position: Lithotomy position.      Pubic Area: No rash.       Labia:         Right: No rash, tenderness or lesion.         Left: No rash, tenderness or lesion.       Urethra: No urethral pain or urethral swelling.      Vagina: Normal. No vaginal discharge or lesions.      Cervix: No cervical motion tenderness, discharge, lesion or cervical bleeding.      Uterus: Normal. Not enlarged, not fixed and not tender.       Adnexa:         Right: No mass, tenderness or fullness.          Left: No mass, tenderness or fullness.        Rectum: No external hemorrhoid.          Comments: Discoloration of labia minora noted  Neurological:      Mental Status: She is alert and oriented to person, place, and time.   Psychiatric:         Behavior: Behavior normal.            Assessment and Plan    Problem List Items Addressed This Visit        Nervous and Auditory    Vulvar burning    Overview     Noted discoloration on exam.  Patient is asymptomatic at this time.  Will have return to clinic for vulvar biopsy.  We discussed possibility of lichen sclerosis as a diagnosis.  We discussed avoiding baths, wearing cotton underwear, and avoiding pads.            Musculoskeletal and Integument    Osteopenia, senile    Overview     DEXA in 2019 showed a T score of -1.3 and spine L1-L4.  She had a T score of -1.2 in the left femoral neck.  Recommend calcium, vitamin D and weightbearing exercises.  Will repeat in 20  21.         Relevant Orders    DEXA Bone Density Axial       Genitourinary    Menopause       Other    Family history of breast cancer in sister    Relevant Orders    Mammo Screening Digital Tomosynthesis Bilateral With CAD      Other Visit Diagnoses     Women's annual routine gynecological examination    -  Primary    Relevant Orders    Pap IG, HPV-hr    Breast cancer screening by mammogram        Relevant Orders    Mammo Screening Digital Tomosynthesis Bilateral With CAD    Varicose veins of vulva and perineum        Relevant Orders    Ambulatory Referral to Vascular Surgery          1. GYN annual well woman exam.   2. Reviewed monthly self breast exams.  Instructed to call with lumps, pain, or breast discharge.  Yearly mammograms ordered.  3. Ordered mammogram today.  4. Recommended use of Vitamin D and getting adequate calcium in her diet. (1500mg)  5. Osteoporosis screening ordered today.  6. Reviewed exercise as a preventative health measures.   7. Reccommended Flu Vaccine in Fall of each year.  8. RTC in 1 year or PRN with problems.   9. Return to clinic in 1 to 2 weeks for vulvar biopsy.    Mckenna López MD  10/26/2020

## 2020-10-30 DIAGNOSIS — Z01.419 WOMEN'S ANNUAL ROUTINE GYNECOLOGICAL EXAMINATION: ICD-10-CM

## 2020-11-10 ENCOUNTER — HOSPITAL ENCOUNTER (OUTPATIENT)
Dept: MAMMOGRAPHY | Facility: HOSPITAL | Age: 60
Discharge: HOME | End: 2020-11-10
Admitting: OBSTETRICS & GYNECOLOGY

## 2020-11-10 DIAGNOSIS — Z12.31 VISIT FOR SCREENING MAMMOGRAM: ICD-10-CM

## 2020-11-10 PROCEDURE — 77067 SCR MAMMO BI INCL CAD: CPT

## 2020-11-10 PROCEDURE — 77063 BREAST TOMOSYNTHESIS BI: CPT

## 2020-11-10 PROCEDURE — 77067 SCR MAMMO BI INCL CAD: CPT | Performed by: RADIOLOGY

## 2020-11-10 PROCEDURE — 77063 BREAST TOMOSYNTHESIS BI: CPT | Performed by: RADIOLOGY

## 2020-11-12 ENCOUNTER — OFFICE VISIT (OUTPATIENT)
Dept: OBSTETRICS AND GYNECOLOGY | Facility: CLINIC | Age: 60
End: 2020-11-12

## 2020-11-12 VITALS
DIASTOLIC BLOOD PRESSURE: 88 MMHG | SYSTOLIC BLOOD PRESSURE: 138 MMHG | HEIGHT: 69 IN | BODY MASS INDEX: 22.81 KG/M2 | WEIGHT: 154 LBS

## 2020-11-12 DIAGNOSIS — N95.1 VAGINAL DRYNESS, MENOPAUSAL: Primary | ICD-10-CM

## 2020-11-12 PROCEDURE — 56605 BIOPSY OF VULVA/PERINEUM: CPT | Performed by: OBSTETRICS & GYNECOLOGY

## 2020-11-12 RX ORDER — ESTRADIOL 0.1 MG/G
2 CREAM VAGINAL DAILY
Qty: 42.5 G | Refills: 4 | Status: SHIPPED | OUTPATIENT
Start: 2020-11-12 | End: 2021-10-27

## 2020-11-12 NOTE — PROGRESS NOTES
Vulvar Biopsy Procedure Note  Biopsy vulvar    Date/Time: 2020 12:10 PM  Performed by: Mckenna López MD  Authorized by: Mckenna López MD   Preparation: Patient was prepped and draped in the usual sterile fashion.  Local anesthesia used: yes    Anesthesia:  Local anesthesia used: yes  Local Anesthetic: lidocaine 1% with epinephrine    Sedation:  Patient sedated: no    Patient tolerance: patient tolerated the procedure well with no immediate complications          Indications: Candida Werner is a 59 y.o. female, , who presents today for vulvar biopsy.  She understands the need for the procedure and associated details.  After being presented with the risk, benefits, and alternatives the patient wished to proceed.  Consent was obtained from patient.     Procedure Details   The patient was placed on the table in a dorsal lithotomy position and draped in the appropriate manner. The area was prepped with Betadine. The area was injected with 4 cc's of 1% Lidocaine with epinephrine, using a 20 gauge needle. After adequate anesthesia was reached, the skin was flattened with one hand and a sample of the abnormal area was made with size for punch biopsy. The skin disc was elevated and scissors used to cut the underlying tissue. The specimen was placed in formalin and sent to pathology for evaluation. Pressure was applied to the biopsy site to control bleeding and silver nitrate was applied.  Hemostasis was achieved    Findings: Skin changes noted with labial folds melting into the labia majora.  Darkened discoloration and patchy areas on labia minora bilaterally.  Physical Exam  Genitourinary:            Specimens: Vulvar biopsy    Complications: none.    The procedure was notable for: No complications  Problems Addressed this Visit     None      Visit Diagnoses     Vaginal dryness, menopausal    -  Primary    Relevant Medications    estradiol (ESTRACE VAGINAL) 0.1 MG/GM vaginal cream       Diagnoses       Codes Comments    Vaginal dryness, menopausal    -  Primary ICD-10-CM: N95.1  ICD-9-CM: 627.2         Plan:  1. Specimens labelled and sent to Pathology.  2. Will base further treatment on Pathology findings.  3. Treatment options discussed with patient.  4. Post biopsy instructions given to patient.    Mckenna López MD  11/12/2020

## 2020-12-31 ENCOUNTER — TELEPHONE (OUTPATIENT)
Dept: OBSTETRICS AND GYNECOLOGY | Facility: CLINIC | Age: 60
End: 2020-12-31

## 2021-05-20 ENCOUNTER — TELEPHONE (OUTPATIENT)
Dept: INTERNAL MEDICINE | Facility: CLINIC | Age: 61
End: 2021-05-20

## 2021-05-25 NOTE — TELEPHONE ENCOUNTER
Pt stated she will be switching providers and will like records be sent over to DR SHERRI MADRIGAL AT [facility] [phone].   please confirm.

## 2021-05-25 NOTE — TELEPHONE ENCOUNTER
Looks as though this is a previous Dr. Rodriguez patient that I have not seen PS please transfer records for patient to the provider she plans to obtain her care.

## 2021-05-27 NOTE — TELEPHONE ENCOUNTER
Spoke to patient and advised her to drop by the office so that we can have her sign a release and send records to new provider.

## 2021-10-27 ENCOUNTER — OFFICE VISIT (OUTPATIENT)
Dept: OBSTETRICS AND GYNECOLOGY | Facility: CLINIC | Age: 61
End: 2021-10-27

## 2021-10-27 VITALS
HEIGHT: 69 IN | WEIGHT: 155 LBS | SYSTOLIC BLOOD PRESSURE: 120 MMHG | BODY MASS INDEX: 22.96 KG/M2 | DIASTOLIC BLOOD PRESSURE: 70 MMHG

## 2021-10-27 DIAGNOSIS — N95.1 VAGINAL DRYNESS, MENOPAUSAL: ICD-10-CM

## 2021-10-27 DIAGNOSIS — Z12.31 BREAST CANCER SCREENING BY MAMMOGRAM: ICD-10-CM

## 2021-10-27 DIAGNOSIS — N90.89 VULVAR LESION: ICD-10-CM

## 2021-10-27 DIAGNOSIS — Z80.3 FAMILY HISTORY OF BREAST CANCER IN SISTER: ICD-10-CM

## 2021-10-27 DIAGNOSIS — Z01.419 WOMEN'S ANNUAL ROUTINE GYNECOLOGICAL EXAMINATION: Primary | ICD-10-CM

## 2021-10-27 DIAGNOSIS — Z78.0 MENOPAUSE: ICD-10-CM

## 2021-10-27 DIAGNOSIS — N94.10 FEMALE DYSPAREUNIA: ICD-10-CM

## 2021-10-27 DIAGNOSIS — M85.80 OSTEOPENIA, SENILE: ICD-10-CM

## 2021-10-27 PROBLEM — N94.89 VULVAR BURNING: Status: RESOLVED | Noted: 2020-10-26 | Resolved: 2021-10-27

## 2021-10-27 PROCEDURE — 99213 OFFICE O/P EST LOW 20 MIN: CPT | Performed by: OBSTETRICS & GYNECOLOGY

## 2021-10-27 PROCEDURE — 99396 PREV VISIT EST AGE 40-64: CPT | Performed by: OBSTETRICS & GYNECOLOGY

## 2021-10-27 RX ORDER — ESTRADIOL 0.1 MG/G
CREAM VAGINAL
Qty: 42.5 G | Refills: 4 | Status: SHIPPED | OUTPATIENT
Start: 2021-10-27 | End: 2022-10-31

## 2021-10-27 NOTE — PROGRESS NOTES
GYN Annual Exam     CC - Here for annual exam.        HPI  Candida Werner is a 60 y.o. female, , who presents for annual well woman exam.  She is postmenopausal.  Patient denies vaginal bleeding. ..  Patient reports problems with: vaginal dryness and she has tried increasing the lubricant during IC but it does not seem to help. She also has tried estrace cream and it has not helped either. Patient also has been going to [facility] ovarian cancer screen, they were following her every 6 months for an 'ovarian mass', the last time she was there (sometime this year) she was told by the [employer] that it appeared to be a fibroid. Pt. states she was told to f/u in 1 year after that although she never got confirmation of if this was a fibroid or not. She denies pelvic pain or problems associated with it. . There were no changes to her medical or surgical history since her last visit. Partner Status: Marital Status: .  New Partners since last visit: no.      Additional OB/GYN History   Current contraception: contraceptive methods: Post menopausal status    On HRT? No  Last Pap : negative with negative HPV, no pap today.  Last Completed Pap Smear          PAP SMEAR (Every 3 Years) Next due on 2023  SCANNED - PAP SMEAR    2020  SCANNED - PAP SMEAR    10/26/2020  Pap IG, HPV-hr    10/10/2019  Done - in [place] (negative)              History of abnormal Pap smear: no  Family history of uterine, colon, breast, or ovarian cancer: yes - sister had breast cancer  Performs monthly Self-Breast Exam: no  Last mammogram: 11/10/2020. Done at .    Last Completed Mammogram          Ordered - MAMMOGRAM (Every 2 Years) Ordered on 10/27/2021    11/10/2020  Mammo Screening Digital Tomosynthesis Bilateral With CAD    2018  Mammo Screening Digital Tomosynthesis Bilateral With CAD    2017  Mammo Screening Digital Tomosynthesis Bilateral With CAD    2016  Mammo screening bilateral w CAD     "          Last colonoscopy: - normal per pt   Last Completed Colonoscopy     This patient has no relevant Health Maintenance data.        Last DEXA: On 10/2019 and results were Osteopenia  Exercises Regularly: yes  Feelings of Anxiety or Depression: no      Tobacco Usage?: No   OB History        2    Para   2    Term   2            AB        Living   2       SAB        IAB        Ectopic        Molar        Multiple        Live Births                    Health Maintenance   Topic Date Due   • DXA SCAN  Never done   • COLORECTAL CANCER SCREENING  Never done   • COVID-19 Vaccine (1) Never done   • ZOSTER VACCINE (1 of 2) Never done   • HEPATITIS C SCREENING  Never done   • ANNUAL PHYSICAL  2019   • LIPID PANEL  2019   • INFLUENZA VACCINE  Never done   • Annual Gynecologic Pelvic and Breast Exam  10/28/2022   • MAMMOGRAM  11/10/2022   • PAP SMEAR  2023   • TDAP/TD VACCINES (2 - Tdap) 2029   • Pneumococcal Vaccine 0-64  Aged Out       The additional following portions of the patient's history were reviewed and updated as appropriate: allergies, current medications, past family history, past medical history, past social history, past surgical history and problem list.    Review of Systems   Constitutional: Negative.    HENT: Negative.    Eyes: Negative.    Respiratory: Negative.    Cardiovascular: Negative.    Gastrointestinal: Negative.    Endocrine: Negative.    Genitourinary:        Vaginal dryness   Musculoskeletal: Negative.    Skin: Negative.    Allergic/Immunologic: Negative.    Neurological: Negative.    Hematological: Negative.    Psychiatric/Behavioral: Negative.        I have reviewed and agree with the HPI, ROS, and historical information as entered above. Mckenna López MD    Objective   /70   Ht 175.3 cm (69\")   Wt 70.3 kg (155 lb)   BMI 22.89 kg/m²     Physical Exam  Vitals and nursing note reviewed. Exam conducted with a chaperone present. "   Constitutional:       Appearance: She is well-developed.   HENT:      Head: Normocephalic and atraumatic.   Neck:      Thyroid: No thyroid mass or thyromegaly.   Cardiovascular:      Rate and Rhythm: Normal rate and regular rhythm.      Heart sounds: No murmur heard.      Pulmonary:      Effort: Pulmonary effort is normal. No retractions.      Breath sounds: Normal breath sounds. No wheezing, rhonchi or rales.   Chest:      Chest wall: No mass or tenderness.   Breasts:      Right: Normal. No mass, nipple discharge, skin change or tenderness.      Left: Normal. No mass, nipple discharge, skin change or tenderness.       Abdominal:      General: Bowel sounds are normal.      Palpations: Abdomen is soft. Abdomen is not rigid. There is no mass.      Tenderness: There is no abdominal tenderness. There is no guarding.      Hernia: No hernia is present. There is no hernia in the left inguinal area or right inguinal area.   Genitourinary:     General: Normal vulva.      Exam position: Lithotomy position.      Pubic Area: No rash.       Labia:         Right: No rash, tenderness or lesion.         Left: No rash, tenderness or lesion.       Urethra: No urethral pain or urethral swelling.      Vagina: Normal. No vaginal discharge or lesions.      Cervix: No cervical motion tenderness, discharge, lesion or cervical bleeding.      Uterus: Normal. Not enlarged, not fixed and not tender.       Adnexa:         Right: No mass, tenderness or fullness.          Left: No mass, tenderness or fullness.        Rectum: No external hemorrhoid.      Comments: Pigment changes over bilateral labia minora. Tight mediators  Musculoskeletal:      Cervical back: Normal range of motion. No muscular tenderness.   Neurological:      Mental Status: She is alert and oriented to person, place, and time.   Psychiatric:         Behavior: Behavior normal.            Assessment and Plan    Problem List Items Addressed This Visit        Family History     Family history of breast cancer in sister    Relevant Orders    Mammo Screening Digital Tomosynthesis Bilateral With CAD       Genitourinary and Reproductive     Menopause    Relevant Orders    DEXA Bone Density Axial    Vulvar lesion    Overview     Biopsy in January 2021 revealed lentigo         Female dyspareunia    Overview     Patient reports intercourse is painful. She mainly pains to be at the old vaginal opening. We will try some Estrace cream, lubricant, and to refer to pelvic floor physical therapy to help with pelvic relaxation.         Relevant Orders    Ambulatory Referral to Physical Therapy Pelvic Floor       Musculoskeletal and Injuries    Osteopenia, senile    Overview     DEXA in 2019 showed a T score of -1.3 and spine L1-L4.  She had a T score of -1.2 in the left femoral neck.  Recommend calcium, vitamin D and weightbearing exercises.  Will repeat in 20 21.         Relevant Orders    DEXA Bone Density Axial      Other Visit Diagnoses     Women's annual routine gynecological examination    -  Primary    Breast cancer screening by mammogram        Relevant Orders    Mammo Screening Digital Tomosynthesis Bilateral With CAD          1. GYN annual well woman exam.   2. Reviewed monthly self breast exams.  Instructed to call with lumps, pain, or breast discharge.  Yearly mammograms ordered.  3. Ordered mammogram today.  4. Recommended use of Vitamin D and getting adequate calcium in her diet. (1500mg)  5. Osteoporosis screening ordered today.  6. Reviewed exercise as a preventative health measures.   7. Reviewed BMI and weight loss as preventative health measures.   8. Symptoms of menopausal transition reviewed with patient.   9. Reccommended Flu Vaccine in Fall of each year.  10. Instructed on [place] exercises and gave patient educational information.  11. RTC in 1 year or PRN with problems.  12. Return in about 1 year (around 10/27/2022) for BMD, Annual physical.     Mckenna López,  MD  10/27/2021     3

## 2021-12-27 ENCOUNTER — TRANSCRIBE ORDERS (OUTPATIENT)
Dept: ADMINISTRATIVE | Facility: HOSPITAL | Age: 61
End: 2021-12-27

## 2021-12-27 DIAGNOSIS — Z12.31 VISIT FOR SCREENING MAMMOGRAM: Primary | ICD-10-CM

## 2022-03-02 ENCOUNTER — HOSPITAL ENCOUNTER (OUTPATIENT)
Dept: MAMMOGRAPHY | Facility: HOSPITAL | Age: 62
Discharge: HOME | End: 2022-03-02
Admitting: OBSTETRICS & GYNECOLOGY

## 2022-03-02 DIAGNOSIS — Z12.31 VISIT FOR SCREENING MAMMOGRAM: ICD-10-CM

## 2022-03-02 PROCEDURE — 77067 SCR MAMMO BI INCL CAD: CPT | Performed by: RADIOLOGY

## 2022-03-02 PROCEDURE — 77067 SCR MAMMO BI INCL CAD: CPT

## 2022-03-02 PROCEDURE — 77063 BREAST TOMOSYNTHESIS BI: CPT

## 2022-03-02 PROCEDURE — 77063 BREAST TOMOSYNTHESIS BI: CPT | Performed by: RADIOLOGY

## 2022-10-31 ENCOUNTER — OFFICE VISIT (OUTPATIENT)
Dept: OBSTETRICS AND GYNECOLOGY | Facility: CLINIC | Age: 62
End: 2022-10-31

## 2022-10-31 VITALS
DIASTOLIC BLOOD PRESSURE: 80 MMHG | WEIGHT: 160.6 LBS | BODY MASS INDEX: 23.79 KG/M2 | SYSTOLIC BLOOD PRESSURE: 126 MMHG | HEIGHT: 69 IN

## 2022-10-31 DIAGNOSIS — Z01.419 WOMEN'S ANNUAL ROUTINE GYNECOLOGICAL EXAMINATION: Primary | ICD-10-CM

## 2022-10-31 DIAGNOSIS — Z12.31 BREAST CANCER SCREENING BY MAMMOGRAM: ICD-10-CM

## 2022-10-31 DIAGNOSIS — M85.80 OSTEOPENIA, SENILE: ICD-10-CM

## 2022-10-31 DIAGNOSIS — Z78.0 MENOPAUSE: ICD-10-CM

## 2022-10-31 PROCEDURE — 99396 PREV VISIT EST AGE 40-64: CPT | Performed by: OBSTETRICS & GYNECOLOGY

## 2022-10-31 NOTE — PROGRESS NOTES
Gynecologic Annual Exam Note        GYN Annual Exam     CC - Here for annual exam.        HPI  Candida Werner is a 61 y.o. female, , who presents for annual well woman exam as a established patient.  She is postmenopausal. Denies vaginal bleeding.  Patient reports problems with: none. There were no changes to her medical or surgical history since her last visit.. Partner Status: Marital Status: .  She is is sexually active. She has not had new partners.. STD testing recommendations have been explained to the patient and she does not desire STD testing.    Additional OB/GYN History   On HRT? No    Last Pap : 10/26/20. Results: negative. HPV: negative  Last Completed Pap Smear          PAP SMEAR (Every 3 Years) Next due on 2023  SCANNED - PAP SMEAR    2020  SCANNED - PAP SMEAR    10/26/2020  Pap IG, HPV-hr    10/10/2019  Done - in [place] (negative)              History of abnormal Pap smear: yes - many years ago  Family history of uterine, colon, breast, or ovarian cancer: yes - yes - sister had breast cancer  Performs monthly Self-Breast Exam: no  Last mammogram: 3/2/2022. Done at .    Last Completed Mammogram          Ordered - MAMMOGRAM (Every 2 Years) Ordered on 10/30/2022    2022  Mammo Screening Digital Tomosynthesis Bilateral With CAD    11/10/2020  Mammo Screening Digital Tomosynthesis Bilateral With CAD    2018  Mammo Screening Digital Tomosynthesis Bilateral With CAD    2017  Mammo Screening Digital Tomosynthesis Bilateral With CAD    2016  Mammo screening bilateral w CAD              Last colonoscopy: has had a colonoscopy 3 year(s) ago.    Last Completed Colonoscopy     This patient has no relevant Health Maintenance data.            Last bone density scan (DEXA): On  and results were Osteopenia  Exercises Regularly: yes  Feelings of Anxiety or Depression: no      Tobacco Usage?: No     No current outpatient medications on  file.    Patient denies the need for medication refills today.    OB History        2    Para   2    Term   2            AB        Living   2       SAB        IAB        Ectopic        Molar        Multiple        Live Births                    Past Medical History:   Diagnosis Date   • Fibrocystic breast    • Hypercholesterolemia    • Hyperlipidemia    • Ovarian mass     with screening at . Pt. states the last time she was there the [employer] tech said it looked like a fibroid. She is now going yearly to  ovarian cancer screen instead of every 6 months   • Ovarian tumor    • Screening breast examination     Self; denies   • Varicella 1966        Past Surgical History:   Procedure Laterality Date   • COLONOSCOPY     • HAND SURGERY     • VARICOSE VEIN SURGERY         Health Maintenance   Topic Date Due   • DXA SCAN  Never done   • COLORECTAL CANCER SCREENING  Never done   • COVID-19 Vaccine (1) Never done   • ZOSTER VACCINE (1 of 2) Never done   • HEPATITIS C SCREENING  Never done   • ANNUAL PHYSICAL  2019   • LIPID PANEL  2019   • INFLUENZA VACCINE  Never done   • Annual Gynecologic Pelvic and Breast Exam  10/28/2022   • PAP SMEAR  2023   • MAMMOGRAM  2024   • TDAP/TD VACCINES (2 - Tdap) 2029   • Pneumococcal Vaccine 0-64  Aged Out       The additional following portions of the patient's history were reviewed and updated as appropriate: allergies, current medications, past family history, past medical history, past social history, past surgical history and problem list.    Review of Systems   Constitutional: Negative.    HENT: Negative.    Eyes: Negative.    Respiratory: Negative.    Cardiovascular: Negative.    Gastrointestinal: Negative.    Endocrine: Negative.    Genitourinary: Negative.    Musculoskeletal: Negative.    Skin: Negative.    Allergic/Immunologic: Negative.    Neurological: Negative.    Hematological: Negative.    Psychiatric/Behavioral: Negative.        I  "have reviewed and agree with the HPI, ROS, and historical information as entered above. Mckenna López MD    Objective   /80   Ht 175.3 cm (69\")   Wt 72.8 kg (160 lb 9.6 oz)   BMI 23.72 kg/m²     Physical Exam  Vitals and nursing note reviewed. Exam conducted with a chaperone present.   Constitutional:       Appearance: She is well-developed.   HENT:      Head: Normocephalic and atraumatic.   Neck:      Thyroid: No thyroid mass or thyromegaly.   Cardiovascular:      Rate and Rhythm: Normal rate and regular rhythm.      Heart sounds: No murmur heard.  Pulmonary:      Effort: Pulmonary effort is normal. No retractions.      Breath sounds: Normal breath sounds. No wheezing, rhonchi or rales.   Chest:      Chest wall: No mass or tenderness.   Breasts:     Right: Normal. No mass, nipple discharge, skin change or tenderness.      Left: Normal. No mass, nipple discharge, skin change or tenderness.   Abdominal:      General: Bowel sounds are normal.      Palpations: Abdomen is soft. Abdomen is not rigid. There is no mass.      Tenderness: There is no abdominal tenderness. There is no guarding.      Hernia: No hernia is present. There is no hernia in the left inguinal area or right inguinal area.   Genitourinary:     General: Normal vulva.      Exam position: Lithotomy position.      Pubic Area: No rash.       Labia:         Right: No rash, tenderness or lesion.         Left: No rash, tenderness or lesion.       Urethra: No urethral pain or urethral swelling.      Vagina: Normal. No vaginal discharge or lesions.      Cervix: No cervical motion tenderness, discharge, lesion or cervical bleeding.      Uterus: Normal. Not enlarged, not fixed and not tender.       Adnexa:         Right: No mass, tenderness or fullness.          Left: No mass, tenderness or fullness.        Rectum: No external hemorrhoid.   Musculoskeletal:      Cervical back: Normal range of motion. No muscular tenderness.   Neurological:      " Mental Status: She is alert and oriented to person, place, and time.   Psychiatric:         Behavior: Behavior normal.            Assessment and Plan    Problem List Items Addressed This Visit        Genitourinary and Reproductive     Menopause    Relevant Orders    DEXA Bone Density Axial       Musculoskeletal and Injuries    Osteopenia, senile    Overview     DEXA in 2019 showed a T score of -1.3 and spine L1-L4.  She had a T score of -1.2 in the left femoral neck.  Recommend calcium, vitamin D and weightbearing exercises.  Will repeat in 20 21.         Relevant Orders    DEXA Bone Density Axial   Other Visit Diagnoses     Women's annual routine gynecological examination    -  Primary    Breast cancer screening by mammogram        Relevant Orders    Mammo Screening Digital Tomosynthesis Bilateral With CAD          1. GYN annual well woman exam.   2. Reviewed monthly self breast exams.  Instructed to call with lumps, pain, or breast discharge.  Yearly mammograms ordered.  3. Ordered mammogram today.  4. Recommended use of Vitamin D and getting adequate calcium in her diet. (1500mg)  5. Osteoporosis screening ordered today.  6. Reviewed exercise as a preventative health measures.   7. Reccommended Flu Vaccine in Fall of each year.  8. RTC in 1 year or PRN with problems.  9. Return in about 1 year (around 10/31/2023) for Annual physical, BMD.     Mckenna López MD  10/31/2022

## 2023-04-17 ENCOUNTER — APPOINTMENT (OUTPATIENT)
Dept: MAMMOGRAPHY | Facility: HOSPITAL | Age: 63
End: 2023-04-17

## 2023-11-02 ENCOUNTER — OFFICE VISIT (OUTPATIENT)
Dept: OBSTETRICS AND GYNECOLOGY | Facility: CLINIC | Age: 63
End: 2023-11-02
Payer: OTHER GOVERNMENT

## 2023-11-02 VITALS
DIASTOLIC BLOOD PRESSURE: 92 MMHG | BODY MASS INDEX: 23.4 KG/M2 | HEIGHT: 69 IN | SYSTOLIC BLOOD PRESSURE: 160 MMHG | WEIGHT: 158 LBS

## 2023-11-02 DIAGNOSIS — Z80.3 FAMILY HISTORY OF BREAST CANCER IN SISTER: ICD-10-CM

## 2023-11-02 DIAGNOSIS — Z78.0 MENOPAUSE: ICD-10-CM

## 2023-11-02 DIAGNOSIS — M85.80 OSTEOPENIA, SENILE: ICD-10-CM

## 2023-11-02 DIAGNOSIS — Z01.419 WOMEN'S ANNUAL ROUTINE GYNECOLOGICAL EXAMINATION: Primary | ICD-10-CM

## 2023-11-02 DIAGNOSIS — Z12.31 BREAST CANCER SCREENING BY MAMMOGRAM: ICD-10-CM

## 2023-11-02 RX ORDER — CHOLECALCIFEROL (VITAMIN D3) 25 MCG
CAPSULE ORAL
COMMUNITY

## 2023-11-02 RX ORDER — UBIDECARENONE 100 MG
100 CAPSULE ORAL DAILY
COMMUNITY

## 2023-11-02 RX ORDER — CAL/D3/MAG11/ZINC/COP/MANG/BOR 600 MG-800
TABLET ORAL
COMMUNITY

## 2023-11-02 RX ORDER — VITAMIN E (DL,TOCOPHERYL ACET) 100 %
LIQUID (ML) MISCELLANEOUS
COMMUNITY

## 2023-11-02 RX ORDER — CINNAMON BARK
POWDER (GRAM) MISCELLANEOUS
COMMUNITY

## 2023-11-02 RX ORDER — GLUCOSAMINE SULFATE 500 MG
CAPSULE ORAL
COMMUNITY

## 2023-11-02 RX ORDER — MULTIPLE VITAMINS W/ MINERALS TAB 9MG-400MCG
1 TAB ORAL DAILY
COMMUNITY

## 2023-11-02 RX ORDER — RIBOFLAVIN (VITAMIN B2) 100 MG
TABLET ORAL
COMMUNITY

## 2023-11-02 NOTE — PROGRESS NOTES
Gynecologic Annual Exam Note        GYN Annual Exam     CC - Here for annual exam.        HPI  Candida Werner is a 62 y.o. female, , who presents for annual well woman exam as a established patient.  She is postmenopausal. Denies vaginal bleeding.  Patient reports problems with:  none . There were no changes to her medical or surgical history since her last visit.. Partner Status: Marital Status: .  She is is sexually active. She has not had new partners.. STD testing recommendations have been explained to the patient and she does not desire STD testing.    Additional OB/GYN History   On HRT? No    Last Pap : 10/26/2020. Results: negative. HPV: negative.   Last Completed Pap Smear            Ordered - PAP SMEAR (Every 3 Years) Ordered on 2020  SCANNED - PAP SMEAR    2020  SCANNED - PAP SMEAR    10/26/2020  Pap IG, HPV-hr    10/10/2019  Done - in [place] (negative)                  History of abnormal Pap smear: yes - man years ago per pt  Family history of uterine, colon, breast, or ovarian cancer: yes - sister had breast cancer  Performs monthly Self-Breast Exam: no  Last mammogram: 7/10/23. Done at .    Last Completed Mammogram            Ordered - MAMMOGRAM (Every 2 Years) Ordered on 2023      07/10/2023  Mammo Screening Digital Tomosynthesis Bilateral With CAD    2022  Mammo Screening Digital Tomosynthesis Bilateral With CAD    11/10/2020  Mammo Screening Digital Tomosynthesis Bilateral With CAD    2018  Mammo Screening Digital Tomosynthesis Bilateral With CAD    2017  Mammo Screening Digital Tomosynthesis Bilateral With CAD    Only the first 5 history entries have been loaded, but more history exists.                  Last colonoscopy: has had a colonoscopy 5 year(s) ago.    Last Completed Colonoscopy       This patient has no relevant Health Maintenance data.              Last bone density scan (DEXA): On 23 and results were  Osteopenia Exercises Regularly: yes  Feelings of Anxiety or Depression: no      Tobacco Usage?: No       Current Outpatient Medications:     ascorbic acid (VITAMIN C) 100 MG tablet, Take  by mouth., Disp: , Rfl:     BETA CAROTENE PO, Take  by mouth., Disp: , Rfl:     Calcium Carbonate-Vit D-Min (Caltrate 600+D Plus Minerals) 600-800 MG-UNIT tablet, Take  by mouth., Disp: , Rfl:     Cholecalciferol (Vitamin D-3) 25 MCG (1000 UT) capsule, Take  by mouth., Disp: , Rfl:     Cinnamon Bark powder, Take  by mouth., Disp: , Rfl:     coenzyme Q10 100 MG capsule, Take 1 capsule by mouth Daily., Disp: , Rfl:     Flaxseed, Linseed, (FLAXSEED OIL PO), Take  by mouth., Disp: , Rfl:     GARLIC PO, Take  by mouth., Disp: , Rfl:     Glucosamine 500 MG capsule, Take  by mouth., Disp: , Rfl:     Green Tea, Jo sinensis, (GREEN TEA EXTRACT PO), Take  by mouth., Disp: , Rfl:     MAGNESIUM-POTASSIUM PO, Take  by mouth., Disp: , Rfl:     multivitamin with minerals (MULTIVITAMIN ADULT PO), Take 1 tablet by mouth Daily., Disp: , Rfl:     Omega-3 Fatty Acids (FISH OIL ADULT GUMMIES PO), Take  by mouth., Disp: , Rfl:     QUERCETIN PO, Take  by mouth., Disp: , Rfl:     RESVERATROL PO, Take  by mouth., Disp: , Rfl:     TURMERIC PO, Take  by mouth., Disp: , Rfl:     Vitamin E Acetate liquid, Take  by mouth., Disp: , Rfl:     Zinc Sulfate (ZINC 15 PO), Take  by mouth., Disp: , Rfl:     Patient denies the need for medication refills today.    OB History          2    Para   2    Term   2            AB        Living   2         SAB        IAB        Ectopic        Molar        Multiple        Live Births   2                Past Medical History:   Diagnosis Date    Fibrocystic breast     Hypercholesterolemia     Hyperlipidemia     Ovarian mass     with screening at [facility]. Pt. states the last time she was there the [place] tech said it looked like a fibroid. She is now going yearly to [facility] ovarian cancer screen instead of every 6 months     "Ovarian tumor     Screening breast examination     Self; denies    Varicella 1964        Past Surgical History:   Procedure Laterality Date    COLONOSCOPY      HAND SURGERY      VARICOSE VEIN SURGERY         Health Maintenance   Topic Date Due    COLORECTAL CANCER SCREENING  Never done    COVID-19 Vaccine (1) Never done    ZOSTER VACCINE (1 of 2) Never done    HEPATITIS C SCREENING  Never done    ANNUAL PHYSICAL  11/29/2019    INFLUENZA VACCINE  08/01/2023    Annual Gynecologic Pelvic and Breast Exam  11/01/2023    PAP SMEAR  11/12/2023    LIPID PANEL  07/12/2024    MAMMOGRAM  07/10/2025    DXA SCAN  11/02/2025    TDAP/TD VACCINES (2 - Tdap) 07/31/2029    Pneumococcal Vaccine 0-64  Aged Out       The additional following portions of the patient's history were reviewed and updated as appropriate: allergies, current medications, past family history, past medical history, past social history, past surgical history, and problem list.    Review of Systems   Constitutional: Negative.    HENT: Negative.     Eyes: Negative.    Respiratory: Negative.     Cardiovascular: Negative.    Gastrointestinal: Negative.    Endocrine: Negative.    Genitourinary: Negative.    Musculoskeletal: Negative.    Skin: Negative.    Allergic/Immunologic: Negative.    Neurological: Negative.    Hematological: Negative.    Psychiatric/Behavioral: Negative.         I have reviewed and agree with the HPI, ROS, and historical information as entered above. Mckenna López MD      Objective   /92   Ht 175.3 cm (69\")   Wt 71.7 kg (158 lb)   BMI 23.33 kg/m²     Physical Exam  Vitals and nursing note reviewed. Exam conducted with a chaperone present.   Constitutional:       Appearance: She is well-developed.   HENT:      Head: Normocephalic and atraumatic.   Neck:      Thyroid: No thyroid mass or thyromegaly.   Cardiovascular:      Rate and Rhythm: Normal rate and regular rhythm.      Heart sounds: No murmur heard.  Pulmonary:      Effort: " Pulmonary effort is normal. No retractions.      Breath sounds: Normal breath sounds. No wheezing, rhonchi or rales.   Chest:      Chest wall: No mass or tenderness.   Breasts:     Right: Normal. No mass, nipple discharge, skin change or tenderness.      Left: Normal. No mass, nipple discharge, skin change or tenderness.   Abdominal:      General: Bowel sounds are normal.      Palpations: Abdomen is soft. Abdomen is not rigid. There is no mass.      Tenderness: There is no abdominal tenderness. There is no guarding.      Hernia: No hernia is present. There is no hernia in the left inguinal area or right inguinal area.   Genitourinary:     General: Normal vulva.      Exam position: Lithotomy position.      Pubic Area: No rash.       Labia:         Right: No rash, tenderness or lesion.         Left: No rash, tenderness or lesion.       Urethra: No urethral pain or urethral swelling.      Vagina: Normal. No vaginal discharge or lesions.      Cervix: No cervical motion tenderness, discharge, lesion or cervical bleeding.      Uterus: Normal. Not enlarged, not fixed and not tender.       Adnexa:         Right: No mass, tenderness or fullness.          Left: No mass, tenderness or fullness.        Rectum: No external hemorrhoid.   Musculoskeletal:      Cervical back: Normal range of motion. No muscular tenderness.   Neurological:      Mental Status: She is alert and oriented to person, place, and time.   Psychiatric:         Behavior: Behavior normal.            Assessment and Plan    Problem List Items Addressed This Visit          Family History    Family history of breast cancer in sister       Genitourinary and Reproductive     Menopause       Musculoskeletal and Injuries    Osteopenia, senile    Overview     DEXA in 2019 showed a T score of -1.3 and spine L1-L4.  She had a T score of -1.2 in the left femoral neck.  Recommend calcium, vitamin D and weightbearing exercises.    11/2/2023 AP spine T score -1.9, femur neck  left T score -1.9, femur neck right T score -1.4, femur total left T score -1.9, femur total right T score -1.3.          Other Visit Diagnoses       Women's annual routine gynecological examination    -  Primary    Relevant Orders    LIQUID-BASED PAP SMEAR WITH HPV GENOTYPING REGARDLESS OF INTERPRETATION ([facility])    Breast cancer screening by mammogram        Relevant Orders    Mammo Screening Digital Tomosynthesis Bilateral With CAD            GYN annual well woman exam.   Reviewed monthly self breast exams.  Instructed to call with lumps, pain, or breast discharge.  Yearly mammograms ordered.  Ordered mammogram today.  Recommended use of Vitamin D and getting adequate calcium in her diet. (1500mg)  Osteoporosis screening ordered today.  Recommended Flu Vaccine in Fall of each year.  RTC in 1 year or PRN with problems.  Return in about 1 year (around 11/2/2024) for Annual physical.     Mckenna López MD  11/02/2023

## 2023-11-03 LAB — REF LAB TEST METHOD: NORMAL

## 2024-08-20 ENCOUNTER — HOSPITAL ENCOUNTER (OUTPATIENT)
Dept: MAMMOGRAPHY | Facility: HOSPITAL | Age: 64
Discharge: HOME | End: 2024-08-20
Admitting: OBSTETRICS & GYNECOLOGY
Payer: MEDICARE

## 2024-08-20 DIAGNOSIS — Z12.31 BREAST CANCER SCREENING BY MAMMOGRAM: ICD-10-CM

## 2024-08-20 PROCEDURE — 77067 SCR MAMMO BI INCL CAD: CPT

## 2024-08-20 PROCEDURE — 77063 BREAST TOMOSYNTHESIS BI: CPT

## 2024-11-07 ENCOUNTER — OFFICE VISIT (OUTPATIENT)
Dept: OBSTETRICS AND GYNECOLOGY | Facility: CLINIC | Age: 64
End: 2024-11-07
Payer: MEDICARE

## 2024-11-07 VITALS
SYSTOLIC BLOOD PRESSURE: 130 MMHG | WEIGHT: 156.2 LBS | HEIGHT: 69 IN | DIASTOLIC BLOOD PRESSURE: 84 MMHG | BODY MASS INDEX: 23.13 KG/M2

## 2024-11-07 DIAGNOSIS — M85.80 OSTEOPENIA, SENILE: ICD-10-CM

## 2024-11-07 DIAGNOSIS — Z78.0 MENOPAUSE: Primary | ICD-10-CM

## 2024-11-07 DIAGNOSIS — Z80.3 FAMILY HISTORY OF BREAST CANCER IN SISTER: ICD-10-CM

## 2024-11-07 DIAGNOSIS — N90.89 VULVAR LESION: ICD-10-CM

## 2024-11-07 RX ORDER — PHYTONADIONE 5 MG/1
5 TABLET ORAL ONCE
COMMUNITY

## 2024-11-07 NOTE — PROGRESS NOTES
Postmenopausal visit    Chief Complaint   Patient presents with    Gynecologic Exam        Subjective     HPI  Candida Werner is a 63 y.o. female, , who presents as a(n) established patient. She is postmenopausal. There were no changes to her medical or surgical history since her last visit.. Marital Status: .  She is sexually active. She has not had new partners.. STD testing recommendations have been explained to the patient and she {declines STD testing.    Patient reports problems with:  none .  Pt. reports no urinary incontinence.     Additional OB/GYN History     On HRT? No    Last Pap : 2023. Results: negative. HPV: negative    Last Completed Pap Smear            PAP SMEAR (Every 3 Years) Next due on 2023  LIQUID-BASED PAP SMEAR WITH HPV GENOTYPING REGARDLESS OF INTERPRETATION ([facility])    2020  SCANNED - PAP SMEAR    2020  SCANNED - PAP SMEAR    10/26/2020  Pap IG, HPV-hr    10/10/2019  Done - in [place] (negative)    Only the first 5 history entries have been loaded, but more history exists.                  History of abnormal Pap smear: no  Family history of uterine, colon, breast, or ovarian cancer: yes - Breast Ca-sister  Performs monthly Self-Breast Exam: no  Last mammogram: 24. Done at .    Last Completed Mammogram            MAMMOGRAM (Every 2 Years) Next due on 2024  Mammo Screening Digital Tomosynthesis Bilateral With CAD    07/10/2023  Mammo Screening Digital Tomosynthesis Bilateral With CAD    2022  Mammo Screening Digital Tomosynthesis Bilateral With CAD    11/10/2020  Mammo Screening Digital Tomosynthesis Bilateral With CAD    2018  Mammo Screening Digital Tomosynthesis Bilateral With CAD    Only the first 5 history entries have been loaded, but more history exists.                  Last colonoscopy: has had a colonoscopy 5 years ago    Last Completed Colonoscopy       This patient  has no relevant Health Maintenance data.          Her last bone density scan was 1 year ago and results were Osteopenia  Exercises Regularly: yes  Feelings of Anxiety or Depression: no    Tobacco Usage?: No       Current Outpatient Medications:     ascorbic acid (VITAMIN C) 100 MG tablet, Take  by mouth., Disp: , Rfl:     BETA CAROTENE PO, Take  by mouth., Disp: , Rfl:     Calcium Carbonate-Vit D-Min (Caltrate 600+D Plus Minerals) 600-800 MG-UNIT tablet, Take  by mouth., Disp: , Rfl:     Cholecalciferol (Vitamin D-3) 25 MCG (1000 UT) capsule, Take  by mouth., Disp: , Rfl:     Cinnamon Bark powder, Take  by mouth., Disp: , Rfl:     coenzyme Q10 100 MG capsule, Take 1 capsule by mouth Daily., Disp: , Rfl:     Flaxseed, Linseed, (FLAXSEED OIL PO), Take  by mouth., Disp: , Rfl:     GARLIC PO, Take  by mouth., Disp: , Rfl:     Glucosamine 500 MG capsule, Take  by mouth., Disp: , Rfl:     Green Tea, Jo sinensis, (GREEN TEA EXTRACT PO), Take  by mouth., Disp: , Rfl:     MAGNESIUM-POTASSIUM PO, Take  by mouth., Disp: , Rfl:     multivitamin with minerals (MULTIVITAMIN ADULT PO), Take 1 tablet by mouth Daily., Disp: , Rfl:     Omega-3 Fatty Acids (FISH OIL ADULT GUMMIES PO), Take  by mouth., Disp: , Rfl:     phytonadione (MEPHYTON, VITAMIN K) 5 MG tablet, Take 1 tablet by mouth 1 (One) Time., Disp: , Rfl:     QUERCETIN PO, Take  by mouth., Disp: , Rfl:     RESVERATROL PO, Take  by mouth., Disp: , Rfl:     TURMERIC PO, Take  by mouth., Disp: , Rfl:     Vitamin E Acetate liquid, Take  by mouth., Disp: , Rfl:     Zinc Sulfate (ZINC 15 PO), Take  by mouth., Disp: , Rfl:     Patient denies the need for medication refills today.    OB History          2    Para   2    Term   2            AB        Living   2         SAB        IAB        Ectopic        Molar        Multiple        Live Births   2                Past Medical History:   Diagnosis Date    Fibrocystic breast     Hypercholesterolemia      "Hyperlipidemia     Ovarian mass     with screening at [facility]. Pt. states the last time she was there the [employer] tech said it looked like a fibroid. She is now going yearly to  ovarian cancer screen instead of every 6 months    Ovarian tumor     Screening breast examination     Self; denies    Varicella 1964        Past Surgical History:   Procedure Laterality Date    COLONOSCOPY      HAND SURGERY      VARICOSE VEIN SURGERY         Health Maintenance   Topic Date Due    COLORECTAL CANCER SCREENING  Never done    ZOSTER VACCINE (1 of 2) Never done    HEPATITIS C SCREENING  Never done    ANNUAL WELLNESS VISIT  Never done    Pneumococcal Vaccine 65+ (1 of 1 - PCV) Never done    LIPID PANEL  07/12/2024    INFLUENZA VACCINE  08/01/2024    COVID-19 Vaccine (1 - 2024-25 season) Never done    DXA SCAN  11/02/2025    MAMMOGRAM  08/20/2026    PAP SMEAR  11/02/2026    TDAP/TD VACCINES (2 - Tdap) 07/31/2029       The additional following portions of the patient's history were reviewed and updated as appropriate: allergies, current medications, past family history, past medical history, past social history, past surgical history, and problem list.    Review of Systems   Constitutional: Negative.    HENT: Negative.     Eyes: Negative.    Respiratory: Negative.     Cardiovascular: Negative.    Gastrointestinal: Negative.    Endocrine: Negative.    Genitourinary: Negative.    Musculoskeletal: Negative.    Skin: Negative.    Allergic/Immunologic: Negative.    Neurological: Negative.    Hematological: Negative.    Psychiatric/Behavioral: Negative.         I have reviewed and agree with the HPI, ROS, and historical information as entered above. Mckenna López MD           Objective   /84   Ht 175.3 cm (69\")   Wt 70.9 kg (156 lb 3.2 oz)   BMI 23.07 kg/m²     Physical Exam  Vitals and nursing note reviewed. Exam conducted with a chaperone present.   Constitutional:       Appearance: She is well-developed.   HENT:      Head: " Normocephalic and atraumatic.   Neck:      Thyroid: No thyroid mass or thyromegaly.   Cardiovascular:      Rate and Rhythm: Normal rate and regular rhythm.      Heart sounds: No murmur heard.  Pulmonary:      Effort: Pulmonary effort is normal. No retractions.      Breath sounds: Normal breath sounds. No wheezing, rhonchi or rales.   Chest:      Chest wall: No mass or tenderness.   Breasts:     Right: Normal. No mass, nipple discharge, skin change or tenderness.      Left: Normal. No mass, nipple discharge, skin change or tenderness.   Abdominal:      General: Bowel sounds are normal.      Palpations: Abdomen is soft. Abdomen is not rigid. There is no mass.      Tenderness: There is no abdominal tenderness. There is no guarding.      Hernia: No hernia is present. There is no hernia in the left inguinal area or right inguinal area.   Genitourinary:     General: Normal vulva.      Exam position: Lithotomy position.      Pubic Area: No rash.       Labia:         Right: No rash, tenderness or lesion.         Left: No rash, tenderness or lesion.       Urethra: No urethral pain or urethral swelling.      Vagina: Normal. No vaginal discharge or lesions.      Cervix: No cervical motion tenderness, discharge, lesion or cervical bleeding.      Uterus: Normal. Not enlarged, not fixed and not tender.       Adnexa:         Right: No mass, tenderness or fullness.          Left: No mass, tenderness or fullness.        Rectum: No external hemorrhoid.          Comments: Pigment change with hypo and hyperpigmented areas in the vaginal introital region.  No ulceration.  Patient reports no itching in the area.  Musculoskeletal:      Cervical back: Normal range of motion. No muscular tenderness.   Neurological:      Mental Status: She is alert and oriented to person, place, and time.   Psychiatric:         Behavior: Behavior normal.         Assessment and Plan         Problem List Items Addressed This Visit          Family History     Family history of breast cancer in sister       Genitourinary and Reproductive     Menopause - Primary    Relevant Orders    DEXA Bone Density Axial    Vulvar lesion    Overview     Biopsy in January 2021 revealed lentigo  11/7/2024-no change in physical exam.  Patient asymptomatic.            Musculoskeletal and Injuries    Osteopenia, senile    Overview     DEXA in 2019 showed a T score of -1.3 and spine L1-L4.  She had a T score of -1.2 in the left femoral neck.  Recommend calcium, vitamin D and weightbearing exercises.    11/2/2023 AP spine T score -1.9, femur neck left T score -1.9, femur neck right T score -1.4, femur total left T score -1.9, femur total right T score -1.3.         Relevant Orders    DEXA Bone Density Axial       Reviewed monthly self breast exams.  Instructed to call with lumps, pain, or breast discharge.  Yearly mammograms ordered.  Ordered mammogram today.  Recommended use of Vitamin D and getting adequate calcium in her diet. (1500mg)  Osteoporosis screening ordered today.  Reviewed exercise as a preventative health measures.   Recommended Flu Vaccine in Fall of each year.  RTC in 1 year or PRN with problems.  Return in about 1 year (around 11/7/2025) for BMD.    Mckenna López MD  11/07/2024

## 2025-07-18 ENCOUNTER — TRANSCRIBE ORDERS (OUTPATIENT)
Dept: ADMINISTRATIVE | Facility: HOSPITAL | Age: 65
End: 2025-07-18
Payer: MEDICARE

## 2025-07-18 DIAGNOSIS — Z12.31 VISIT FOR SCREENING MAMMOGRAM: Primary | ICD-10-CM
